# Patient Record
Sex: FEMALE | Race: WHITE | Employment: OTHER | URBAN - METROPOLITAN AREA
[De-identification: names, ages, dates, MRNs, and addresses within clinical notes are randomized per-mention and may not be internally consistent; named-entity substitution may affect disease eponyms.]

---

## 2018-05-01 PROBLEM — E78.2 MIXED HYPERLIPIDEMIA: Status: ACTIVE | Noted: 2018-05-01

## 2018-05-03 PROBLEM — E05.00 THYROTOXIC EXOPHTHALMOS: Status: ACTIVE | Noted: 2018-05-03

## 2018-05-03 PROBLEM — E89.0 HYPOTHYROIDISM FOLLOWING RADIOIODINE THERAPY: Status: ACTIVE | Noted: 2018-05-03

## 2018-05-03 PROBLEM — E66.01 SEVERE OBESITY (BMI 35.0-39.9) WITH COMORBIDITY (HCC): Status: ACTIVE | Noted: 2018-05-03

## 2018-05-17 ENCOUNTER — HOSPITAL ENCOUNTER (OUTPATIENT)
Dept: MAMMOGRAPHY | Age: 68
Discharge: HOME | End: 2018-05-17
Attending: FAMILY MEDICINE
Payer: COMMERCIAL

## 2018-05-17 PROCEDURE — 77080 DXA BONE DENSITY AXIAL: CPT

## 2018-05-17 PROCEDURE — 77067 SCR MAMMO BI INCL CAD: CPT

## 2021-06-17 LAB — MAMMOGRAPHY, EXTERNAL: NORMAL

## 2022-03-04 PROBLEM — E66.3 OVERWEIGHT: Status: ACTIVE | Noted: 2018-05-03

## 2022-03-18 PROBLEM — E78.2 MIXED HYPERLIPIDEMIA: Status: ACTIVE | Noted: 2018-05-01

## 2022-03-19 PROBLEM — E05.00 THYROTOXIC EXOPHTHALMOS: Status: ACTIVE | Noted: 2018-05-03

## 2022-03-19 PROBLEM — E66.3 OVERWEIGHT: Status: ACTIVE | Noted: 2018-05-03

## 2022-03-20 PROBLEM — E89.0 HYPOTHYROIDISM FOLLOWING RADIOIODINE THERAPY: Status: ACTIVE | Noted: 2018-05-03

## 2022-06-06 ENCOUNTER — TELEPHONE (OUTPATIENT)
Dept: FAMILY MEDICINE CLINIC | Facility: CLINIC | Age: 72
End: 2022-06-06

## 2022-06-06 NOTE — TELEPHONE ENCOUNTER
Pt called and stated that she her TSH needs to be repeated. She will need an order for [facility] so that she can get it drawn there.     Thank you

## 2022-06-09 DIAGNOSIS — E03.9 ACQUIRED HYPOTHYROIDISM: Primary | ICD-10-CM

## 2022-06-15 ENCOUNTER — TELEPHONE (OUTPATIENT)
Dept: FAMILY MEDICINE CLINIC | Facility: CLINIC | Age: 72
End: 2022-06-15

## 2022-06-15 NOTE — TELEPHONE ENCOUNTER
[facility]  Call pt and let her know when it is sent.

## 2022-06-16 ENCOUNTER — TELEPHONE (OUTPATIENT)
Dept: FAMILY MEDICINE CLINIC | Facility: CLINIC | Age: 72
End: 2022-06-16

## 2022-06-16 RX ORDER — OXYBUTYNIN CHLORIDE 5 MG/1
5 TABLET ORAL 3 TIMES DAILY
Qty: 90 TABLET | Refills: 3 | Status: SHIPPED | OUTPATIENT
Start: 2022-06-16 | End: 2022-06-20 | Stop reason: SDUPTHER

## 2022-06-20 ENCOUNTER — TELEPHONE (OUTPATIENT)
Dept: FAMILY MEDICINE CLINIC | Facility: CLINIC | Age: 72
End: 2022-06-20

## 2022-06-20 DIAGNOSIS — E03.9 ACQUIRED HYPOTHYROIDISM: ICD-10-CM

## 2022-06-20 DIAGNOSIS — N32.81 OAB (OVERACTIVE BLADDER): Primary | ICD-10-CM

## 2022-06-20 RX ORDER — OXYBUTYNIN CHLORIDE 5 MG/1
5 TABLET ORAL 3 TIMES DAILY
Qty: 270 TABLET | Refills: 3 | Status: SHIPPED | OUTPATIENT
Start: 2022-06-20

## 2022-07-06 ENCOUNTER — TELEPHONE (OUTPATIENT)
Dept: FAMILY MEDICINE CLINIC | Facility: CLINIC | Age: 72
End: 2022-07-06

## 2022-07-06 DIAGNOSIS — E03.9 ACQUIRED HYPOTHYROIDISM: Primary | ICD-10-CM

## 2022-07-06 NOTE — TELEPHONE ENCOUNTER
----- Message from Meriam Scheuermann sent at 7/5/2022  4:31 PM EDT -----  Subject: Message to Provider    QUESTIONS  Information for Provider? Pt received an email and has no access to my   chart, did speak with my chart help and still doesn't have access. Pt is   wanting whatever was in that message left on her vm please. Pt request a   call back she thinks it is regarding lab work.   ---------------------------------------------------------------------------  --------------  [id number] [employer]  [phone]; OK to leave message on voicemail  ---------------------------------------------------------------------------  --------------  SCRIPT ANSWERS  Relationship to Patient?  Self

## 2022-08-05 RX ORDER — LEVOTHYROXINE SODIUM 88 UG/1
88 TABLET ORAL
Qty: 30 TABLET | Refills: 0 | Status: SHIPPED | OUTPATIENT
Start: 2022-08-05 | End: 2022-09-02 | Stop reason: SDUPTHER

## 2022-11-03 DIAGNOSIS — J02.9 ACUTE PHARYNGITIS, UNSPECIFIED ETIOLOGY: Primary | ICD-10-CM

## 2022-11-03 RX ORDER — AZITHROMYCIN 250 MG/1
250 TABLET, FILM COATED ORAL DAILY
Qty: 6 TABLET | Refills: 0 | Status: SHIPPED | OUTPATIENT
Start: 2022-11-03

## 2023-02-01 DIAGNOSIS — E03.8 OTHER SPECIFIED HYPOTHYROIDISM: Primary | ICD-10-CM

## 2023-03-27 SDOH — ECONOMIC STABILITY: FOOD INSECURITY: WITHIN THE PAST 12 MONTHS, THE FOOD YOU BOUGHT JUST DIDN'T LAST AND YOU DIDN'T HAVE MONEY TO GET MORE.: NEVER TRUE

## 2023-03-27 SDOH — ECONOMIC STABILITY: INCOME INSECURITY: HOW HARD IS IT FOR YOU TO PAY FOR THE VERY BASICS LIKE FOOD, HOUSING, MEDICAL CARE, AND HEATING?: NOT HARD AT ALL

## 2023-03-27 SDOH — ECONOMIC STABILITY: FOOD INSECURITY: WITHIN THE PAST 12 MONTHS, YOU WORRIED THAT YOUR FOOD WOULD RUN OUT BEFORE YOU GOT MONEY TO BUY MORE.: NEVER TRUE

## 2023-03-27 SDOH — ECONOMIC STABILITY: TRANSPORTATION INSECURITY
IN THE PAST 12 MONTHS, HAS LACK OF TRANSPORTATION KEPT YOU FROM MEETINGS, WORK, OR FROM GETTING THINGS NEEDED FOR DAILY LIVING?: NO

## 2023-03-27 SDOH — ECONOMIC STABILITY: HOUSING INSECURITY
IN THE LAST 12 MONTHS, WAS THERE A TIME WHEN YOU DID NOT HAVE A STEADY PLACE TO SLEEP OR SLEPT IN A SHELTER (INCLUDING NOW)?: NO

## 2023-03-27 SDOH — HEALTH STABILITY: PHYSICAL HEALTH: ON AVERAGE, HOW MANY DAYS PER WEEK DO YOU ENGAGE IN MODERATE TO STRENUOUS EXERCISE (LIKE A BRISK WALK)?: 0 DAYS

## 2023-03-27 ASSESSMENT — LIFESTYLE VARIABLES
HOW OFTEN DO YOU HAVE A DRINK CONTAINING ALCOHOL: 2-3 TIMES A WEEK
HOW OFTEN DO YOU HAVE SIX OR MORE DRINKS ON ONE OCCASION: 1
HOW MANY STANDARD DRINKS CONTAINING ALCOHOL DO YOU HAVE ON A TYPICAL DAY: 1 OR 2
HOW OFTEN DO YOU HAVE A DRINK CONTAINING ALCOHOL: 4
HOW MANY STANDARD DRINKS CONTAINING ALCOHOL DO YOU HAVE ON A TYPICAL DAY: 1

## 2023-03-27 ASSESSMENT — PATIENT HEALTH QUESTIONNAIRE - PHQ9
1. LITTLE INTEREST OR PLEASURE IN DOING THINGS: 0
SUM OF ALL RESPONSES TO PHQ QUESTIONS 1-9: 0
2. FEELING DOWN, DEPRESSED OR HOPELESS: 0
SUM OF ALL RESPONSES TO PHQ QUESTIONS 1-9: 0
SUM OF ALL RESPONSES TO PHQ9 QUESTIONS 1 & 2: 0
SUM OF ALL RESPONSES TO PHQ QUESTIONS 1-9: 0
SUM OF ALL RESPONSES TO PHQ QUESTIONS 1-9: 0

## 2023-03-30 ENCOUNTER — OFFICE VISIT (OUTPATIENT)
Dept: FAMILY MEDICINE CLINIC | Facility: CLINIC | Age: 73
End: 2023-03-30
Payer: COMMERCIAL

## 2023-03-30 VITALS
BODY MASS INDEX: 31.5 KG/M2 | TEMPERATURE: 97 F | RESPIRATION RATE: 18 BRPM | SYSTOLIC BLOOD PRESSURE: 132 MMHG | OXYGEN SATURATION: 99 % | DIASTOLIC BLOOD PRESSURE: 84 MMHG | WEIGHT: 196 LBS | HEART RATE: 66 BPM | HEIGHT: 66 IN

## 2023-03-30 DIAGNOSIS — J30.9 ALLERGIC RHINITIS, UNSPECIFIED SEASONALITY, UNSPECIFIED TRIGGER: ICD-10-CM

## 2023-03-30 DIAGNOSIS — M15.9 PRIMARY OSTEOARTHRITIS INVOLVING MULTIPLE JOINTS: ICD-10-CM

## 2023-03-30 DIAGNOSIS — G43.909 MIGRAINE WITHOUT STATUS MIGRAINOSUS, NOT INTRACTABLE, UNSPECIFIED MIGRAINE TYPE: ICD-10-CM

## 2023-03-30 DIAGNOSIS — Z12.11 COLON CANCER SCREENING: ICD-10-CM

## 2023-03-30 DIAGNOSIS — N32.81 OAB (OVERACTIVE BLADDER): ICD-10-CM

## 2023-03-30 DIAGNOSIS — I10 ESSENTIAL (PRIMARY) HYPERTENSION: Primary | ICD-10-CM

## 2023-03-30 DIAGNOSIS — E78.2 MIXED HYPERLIPIDEMIA: ICD-10-CM

## 2023-03-30 DIAGNOSIS — E03.9 HYPOTHYROIDISM, UNSPECIFIED TYPE: ICD-10-CM

## 2023-03-30 PROBLEM — E66.09 CLASS 1 OBESITY DUE TO EXCESS CALORIES WITH SERIOUS COMORBIDITY AND BODY MASS INDEX (BMI) OF 31.0 TO 31.9 IN ADULT: Status: ACTIVE | Noted: 2018-05-03

## 2023-03-30 PROBLEM — E66.811 CLASS 1 OBESITY DUE TO EXCESS CALORIES WITH SERIOUS COMORBIDITY AND BODY MASS INDEX (BMI) OF 31.0 TO 31.9 IN ADULT: Status: ACTIVE | Noted: 2018-05-03

## 2023-03-30 PROCEDURE — 1123F ACP DISCUSS/DSCN MKR DOCD: CPT | Performed by: FAMILY MEDICINE

## 2023-03-30 PROCEDURE — 3079F DIAST BP 80-89 MM HG: CPT | Performed by: FAMILY MEDICINE

## 2023-03-30 PROCEDURE — 3075F SYST BP GE 130 - 139MM HG: CPT | Performed by: FAMILY MEDICINE

## 2023-03-30 PROCEDURE — 99214 OFFICE O/P EST MOD 30 MIN: CPT | Performed by: FAMILY MEDICINE

## 2023-03-30 RX ORDER — FLUTICASONE PROPIONATE 50 MCG
2 SPRAY, SUSPENSION (ML) NASAL DAILY
Qty: 48 G | Refills: 3 | Status: SHIPPED | OUTPATIENT
Start: 2023-03-30 | End: 2023-03-30 | Stop reason: SDUPTHER

## 2023-03-30 RX ORDER — LEVOTHYROXINE SODIUM 88 UG/1
88 TABLET ORAL
Qty: 90 TABLET | Refills: 3 | Status: SHIPPED | OUTPATIENT
Start: 2023-03-30 | End: 2023-03-30 | Stop reason: SDUPTHER

## 2023-03-30 RX ORDER — FLUTICASONE PROPIONATE 50 MCG
2 SPRAY, SUSPENSION (ML) NASAL DAILY
Qty: 48 G | Refills: 3 | Status: SHIPPED | OUTPATIENT
Start: 2023-03-30

## 2023-03-30 RX ORDER — OXYBUTYNIN CHLORIDE 5 MG/1
5 TABLET ORAL 3 TIMES DAILY
Qty: 270 TABLET | Refills: 3 | Status: SHIPPED | OUTPATIENT
Start: 2023-03-30

## 2023-03-30 RX ORDER — TRAMADOL HYDROCHLORIDE 50 MG/1
50 TABLET ORAL EVERY 4 HOURS PRN
Qty: 270 TABLET | Refills: 1 | Status: SHIPPED | OUTPATIENT
Start: 2023-03-30 | End: 2023-05-14

## 2023-03-30 RX ORDER — ATORVASTATIN CALCIUM 10 MG/1
10 TABLET, FILM COATED ORAL NIGHTLY
Qty: 90 TABLET | Refills: 3 | Status: SHIPPED | OUTPATIENT
Start: 2023-03-30

## 2023-03-30 RX ORDER — TRAMADOL HYDROCHLORIDE 50 MG/1
50 TABLET ORAL EVERY 4 HOURS PRN
Qty: 270 TABLET | Refills: 1 | Status: SHIPPED | OUTPATIENT
Start: 2023-03-30 | End: 2023-03-30 | Stop reason: SDUPTHER

## 2023-03-30 RX ORDER — LEVOTHYROXINE SODIUM 88 UG/1
88 TABLET ORAL
Qty: 90 TABLET | Refills: 3 | Status: SHIPPED | OUTPATIENT
Start: 2023-03-30

## 2023-03-30 RX ORDER — ATORVASTATIN CALCIUM 10 MG/1
10 TABLET, FILM COATED ORAL NIGHTLY
Qty: 90 TABLET | Refills: 3 | Status: SHIPPED | OUTPATIENT
Start: 2023-03-30 | End: 2023-03-30 | Stop reason: SDUPTHER

## 2023-03-30 RX ORDER — OXYBUTYNIN CHLORIDE 5 MG/1
5 TABLET ORAL 3 TIMES DAILY
Qty: 270 TABLET | Refills: 3 | Status: SHIPPED | OUTPATIENT
Start: 2023-03-30 | End: 2023-03-30 | Stop reason: SDUPTHER

## 2023-03-30 ASSESSMENT — ENCOUNTER SYMPTOMS
SHORTNESS OF BREATH: 0
VOMITING: 0
DIARRHEA: 0
NAUSEA: 0
COUGH: 0

## 2023-03-30 NOTE — PROGRESS NOTES
Berenice Kerns (:  1950) is a 67 y.o. female,Established patient, here for evaluation of the following chief complaint(s):  Follow-up (Yearly follow up. ), Hypertension, Cholesterol Problem, and Thyroid Problem         ASSESSMENT/PLAN:  1. Essential (primary) hypertension- well-controlled  -     CBC with Auto Differential; Future  -     Comprehensive Metabolic Panel; Future  2. Mixed hyperlipidemia- repeat fasting labs  -     Comprehensive Metabolic Panel; Future  -     Lipase; Future  -     atorvastatin (LIPITOR) 10 MG tablet; Take 1 tablet by mouth at bedtime, Disp-90 tablet, R-3Print  3. Hypothyroidism, unspecified type  -     TSH; Future  -     levothyroxine (SYNTHROID) 88 MCG tablet; Take 1 tablet by mouth every morning (before breakfast), Disp-90 tablet, R-3Print  4. OAB (overactive bladder)  -     oxybutynin (DITROPAN) 5 MG tablet; Take 1 tablet by mouth 3 times daily, Disp-270 tablet, R-3Print  5. Migraine without status migrainosus, not intractable, unspecified migraine type  Assessment & Plan:   Borderline controlled, continue current medications  6. Primary osteoarthritis involving multiple joints  Assessment & Plan:   Borderline controlled, continue current medications  Orders:  -     CBC with Auto Differential; Future  -     traMADol (ULTRAM) 50 MG tablet; Take 1 tablet by mouth every 4 hours as needed for Pain for up to 45 days. Max Daily Amount: 300 mg, Disp-270 tablet, R-1Print  7. Colon cancer screening  -     POCT FECAL IMMUNOCHEMICAL TEST (FIT) ()  8. Allergic rhinitis, unspecified seasonality, unspecified trigger  -     fluticasone (FLONASE) 50 MCG/ACT nasal spray; 2 sprays by Nasal route daily USE 2 SPRAYS IN BOTH NOSTRILS DAILY FOR ALLERGIC RHINITIS Indications: inflammation of the nose due to an allergy, Disp-48 g, R-3Print  9. Obesity- BMI handout    Return in about 6 months (around 2023) for fasting chronic care.          Subjective

## 2023-03-30 NOTE — LETTER
2023       Sherrie Palm  [address]    To Whom It May Concern:     Barrington Primus Perez,  1950, takes the following OTC meds as medical necessity. Allegra 180mg 1 po qd for allergies  Benadryl 20mg 1 po qpm prn allergy symptoms  Citracal Max 2 tablets daily for bone health  Dramamine as needed for motion sickness  Immodium as needed for diarrhea  Magnilife as needed  Osteo Bi-Flex 2 tablets daily for arthritis  Refresh Optive Eye Drops as needed for irritated /allergic eyes    Thank you for you attention to this matter    If you have any questions or concerns, please don't hesitate to call.     Sincerely,        Emanuel Gavin MD

## 2023-04-03 ENCOUNTER — OFFICE VISIT (OUTPATIENT)
Dept: FAMILY MEDICINE CLINIC | Facility: CLINIC | Age: 73
End: 2023-04-03

## 2023-04-03 VITALS — HEIGHT: 66 IN | RESPIRATION RATE: 18 BRPM | WEIGHT: 196 LBS | BODY MASS INDEX: 31.5 KG/M2 | TEMPERATURE: 98 F

## 2023-04-03 DIAGNOSIS — N32.81 OAB (OVERACTIVE BLADDER): ICD-10-CM

## 2023-04-03 DIAGNOSIS — E03.9 HYPOTHYROIDISM, UNSPECIFIED TYPE: ICD-10-CM

## 2023-04-03 DIAGNOSIS — M15.9 PRIMARY OSTEOARTHRITIS INVOLVING MULTIPLE JOINTS: ICD-10-CM

## 2023-04-03 DIAGNOSIS — E78.2 MIXED HYPERLIPIDEMIA: ICD-10-CM

## 2023-04-03 DIAGNOSIS — J30.9 ALLERGIC RHINITIS, UNSPECIFIED SEASONALITY, UNSPECIFIED TRIGGER: ICD-10-CM

## 2023-04-03 RX ORDER — FLUTICASONE PROPIONATE 50 MCG
2 SPRAY, SUSPENSION (ML) NASAL DAILY
Qty: 48 G | Refills: 3 | Status: SHIPPED | OUTPATIENT
Start: 2023-04-03

## 2023-04-03 RX ORDER — OXYBUTYNIN CHLORIDE 5 MG/1
5 TABLET ORAL 3 TIMES DAILY
Qty: 270 TABLET | Refills: 3 | Status: SHIPPED | OUTPATIENT
Start: 2023-04-03

## 2023-04-03 RX ORDER — TRAMADOL HYDROCHLORIDE 50 MG/1
50 TABLET ORAL EVERY 4 HOURS PRN
Qty: 270 TABLET | Refills: 1 | Status: SHIPPED | OUTPATIENT
Start: 2023-04-03 | End: 2023-07-02

## 2023-04-03 RX ORDER — MELOXICAM 15 MG/1
15 TABLET ORAL DAILY
Qty: 90 TABLET | Refills: 3 | Status: SHIPPED | OUTPATIENT
Start: 2023-04-03

## 2023-04-03 RX ORDER — LEVOTHYROXINE SODIUM 88 UG/1
88 TABLET ORAL
Qty: 90 TABLET | Refills: 3 | Status: SHIPPED | OUTPATIENT
Start: 2023-04-03

## 2023-04-03 RX ORDER — ATORVASTATIN CALCIUM 10 MG/1
10 TABLET, FILM COATED ORAL NIGHTLY
Qty: 90 TABLET | Refills: 3 | Status: SHIPPED | OUTPATIENT
Start: 2023-04-03

## 2023-04-03 ASSESSMENT — ENCOUNTER SYMPTOMS
VOMITING: 0
COUGH: 0
SHORTNESS OF BREATH: 0
NAUSEA: 0
DIARRHEA: 0

## 2023-04-03 NOTE — PROGRESS NOTES
Steve Duffy (:  1950) is a 67 y.o. female,Established patient, here for evaluation of the following chief complaint(s):  Follow-up (Discuss meds. ) Meds are messed up due to eRx went to pharmacy[facility] and hers will not accelt the printed ones dated same date. She has cancelled the eRxs and needs new ones. Also, needs refill of Mobic, which we did not get last OV. ASSESSMENT/PLAN:  1. Hypothyroidism, unspecified type- TSH drawn today  -     levothyroxine (SYNTHROID) 88 MCG tablet; Take 1 tablet by mouth every morning (before breakfast), Disp-90 tablet, R-3Print  HAD labs at [facility] this am ([facility])- may need to adjust dose after resulted, so pt is holding this Rx until resulted  2. OAB (overactive bladder)- new Rx for med given  -     oxybutynin (DITROPAN) 5 MG tablet; Take 1 tablet by mouth 3 times daily, Disp-270 tablet, R-3Print  3. Allergic rhinitis, unspecified seasonality, unspecified trigger- new Rx for med given  -     fluticasone (FLONASE) 50 MCG/ACT nasal spray; 2 sprays by Nasal route daily USE 2 SPRAYS IN BOTH NOSTRILS DAILY FOR ALLERGIC RHINITIS Indications: inflammation of the nose due to an allergy, Disp-48 g, R-3Print  4. Mixed hyperlipidemia-new Rx for med given  -     atorvastatin (LIPITOR) 10 MG tablet; Take 1 tablet by mouth at bedtime, Disp-90 tablet, R-3Print  5. Primary osteoarthritis involving multiple joints- new Rx for meds given  -     traMADol (ULTRAM) 50 MG tablet; Take 1 tablet by mouth every 4 hours as needed for Pain for up to 90 days. Max Daily Amount: 300 mg, Disp-270 tablet, R-1Print  -     meloxicam (MOBIC) 15 MG tablet; Take 1 tablet by mouth daily TAKE 1 TABLET BY MOUTH DAILY FOR OSTEOARTHRITIS, Disp-90 tablet, R-3Print      Return in about 6 months (around 10/3/2023) for fasting chronic care. Subjective   SUBJECTIVE/OBJECTIVE:  Hyperlipidemia  This is a chronic problem. The current episode started more than 1 year ago. The problem is controlled.

## 2023-04-05 DIAGNOSIS — I10 ESSENTIAL (PRIMARY) HYPERTENSION: ICD-10-CM

## 2023-04-05 DIAGNOSIS — E03.9 HYPOTHYROIDISM, UNSPECIFIED TYPE: ICD-10-CM

## 2023-04-05 DIAGNOSIS — E78.2 MIXED HYPERLIPIDEMIA: ICD-10-CM

## 2023-04-05 DIAGNOSIS — M15.9 PRIMARY OSTEOARTHRITIS INVOLVING MULTIPLE JOINTS: ICD-10-CM

## 2023-07-17 DIAGNOSIS — J02.9 ACUTE PHARYNGITIS, UNSPECIFIED ETIOLOGY: ICD-10-CM

## 2023-07-17 RX ORDER — AZITHROMYCIN 250 MG/1
250 TABLET, FILM COATED ORAL DAILY
Qty: 6 TABLET | Refills: 0 | Status: SHIPPED | OUTPATIENT
Start: 2023-07-17

## 2023-07-21 RX ORDER — DOXYCYCLINE HYCLATE 100 MG
100 TABLET ORAL 2 TIMES DAILY
Qty: 20 TABLET | Refills: 0 | Status: SHIPPED | OUTPATIENT
Start: 2023-07-21

## 2023-09-12 LAB
MAMMOGRAPHY, EXTERNAL: NORMAL
MAMMOGRAPHY, EXTERNAL: NORMAL

## 2023-12-04 ENCOUNTER — TELEPHONE (OUTPATIENT)
Dept: FAMILY MEDICINE CLINIC | Facility: CLINIC | Age: 73
End: 2023-12-04

## 2023-12-04 NOTE — TELEPHONE ENCOUNTER
Erika Brown with Client Billing Department with Laboratory wants to speak to you about this patient lab bill from 1/30. She wants to know if this lab bill will be with patient direct bill or facility bill. Please advise at [phone] and select option 3.   Account #: [de-identified]

## 2024-12-31 ENCOUNTER — COMMUNITY OUTREACH (OUTPATIENT)
Dept: FAMILY MEDICINE CLINIC | Facility: CLINIC | Age: 74
End: 2024-12-31

## 2024-12-31 NOTE — PROGRESS NOTES
Patient's HM shows they are overdue for Mammogram.  Care Everywhere and  files searched.   updated with 9/12/23 Mammogram.

## 2025-01-28 ENCOUNTER — CLINICAL DOCUMENTATION (OUTPATIENT)
Dept: FAMILY MEDICINE CLINIC | Facility: CLINIC | Age: 75
End: 2025-01-28